# Patient Record
Sex: MALE | Race: WHITE | URBAN - METROPOLITAN AREA
[De-identification: names, ages, dates, MRNs, and addresses within clinical notes are randomized per-mention and may not be internally consistent; named-entity substitution may affect disease eponyms.]

---

## 2017-04-11 ENCOUNTER — MYC REFILL (OUTPATIENT)
Dept: FAMILY MEDICINE | Facility: CLINIC | Age: 62
End: 2017-04-11

## 2017-04-11 DIAGNOSIS — I10 ESSENTIAL HYPERTENSION: Primary | ICD-10-CM

## 2017-04-11 DIAGNOSIS — E78.5 HYPERLIPIDEMIA LDL GOAL <130: ICD-10-CM

## 2017-04-11 RX ORDER — LISINOPRIL 20 MG/1
20 TABLET ORAL DAILY
Qty: 90 TABLET | Refills: 1 | Status: SHIPPED | OUTPATIENT
Start: 2017-04-11 | End: 2017-06-20

## 2017-04-11 RX ORDER — SIMVASTATIN 20 MG
20 TABLET ORAL AT BEDTIME
Qty: 90 TABLET | Refills: 1 | Status: SHIPPED | OUTPATIENT
Start: 2017-04-11 | End: 2017-06-20

## 2017-04-11 NOTE — TELEPHONE ENCOUNTER
simvastatin (ZOCOR) 20 MG tablet     Last Written Prescription Date: 9/12/2016  Last Fill Quantity: 90, # refills: 3  Last Office Visit with [facility] or  Health prescribing provider: 9/12/2016  Next 5 appointments (look out 90 days)     Jun 19, 2017  8:20 AM CDT   PHYSICAL with KIM Aviles CNP   [facility] ([facility])    [address], [address]  [place] [address]   [phone]                   Lab Results   Component Value Date    CHOL 188 09/12/2016     Lab Results   Component Value Date    HDL 39 09/12/2016     Lab Results   Component Value Date     09/12/2016     Lab Results   Component Value Date    TRIG 202 09/12/2016     Lab Results   Component Value Date    CHOLHDLRATIO 4.0 09/09/2015     lisinopril (PRINIVIL,ZESTRIL) 20 MG tablet      Last Written Prescription Date: 9/12/2016  Last Fill Quantity: 90, # refills: 3  Last Office Visit with [facility] or  Health prescribing provider: 9/12/2016  Next 5 appointments (look out 90 days)     Jun 19, 2017  8:20 AM CDT   PHYSICAL with KIM Aviles CNP   [facility] ([facility])    [address], [address]  [place] [id number] [phone]                   Potassium   Date Value Ref Range Status   09/12/2016 3.9 3.4 - 5.3 mmol/L Final     Creatinine   Date Value Ref Range Status   09/12/2016 1.00 0.66 - 1.25 mg/dL Final     BP Readings from Last 3 Encounters:   09/12/16 122/78   08/09/16 128/80   09/09/15 122/68

## 2017-04-11 NOTE — TELEPHONE ENCOUNTER
[facility]  Original authorizing provider: KIM Avelar CNP    Edgar RALPHIsrael Salomon would like a refill of the following medications:  lisinopril (PRINIVIL,ZESTRIL) 20 MG tablet [KIM Avelar CNP]  simvastatin (ZOCOR) 20 MG tablet [KIM Avelar CNP]    Preferred pharmacy: [facility] [phone]    Comment:  I believe I have refills remaining on both Lisinopril and Simvastatin but I don't have the original packaging and my [facility] here in [facility] could not find them. Are you able to send new prescriptions (or resend the existing ones) so that I may refill?

## 2017-06-20 ENCOUNTER — MYC MEDICAL ADVICE (OUTPATIENT)
Dept: FAMILY MEDICINE | Facility: CLINIC | Age: 62
End: 2017-06-20

## 2017-06-20 ENCOUNTER — OFFICE VISIT (OUTPATIENT)
Dept: FAMILY MEDICINE | Facility: CLINIC | Age: 62
End: 2017-06-20
Payer: COMMERCIAL

## 2017-06-20 VITALS
HEART RATE: 68 BPM | WEIGHT: 198 LBS | HEIGHT: 72 IN | BODY MASS INDEX: 26.82 KG/M2 | TEMPERATURE: 97.6 F | DIASTOLIC BLOOD PRESSURE: 68 MMHG | SYSTOLIC BLOOD PRESSURE: 124 MMHG | RESPIRATION RATE: 16 BRPM

## 2017-06-20 DIAGNOSIS — E78.5 HYPERLIPIDEMIA LDL GOAL <130: ICD-10-CM

## 2017-06-20 DIAGNOSIS — F98.8 ATTENTION DEFICIT DISORDER: ICD-10-CM

## 2017-06-20 DIAGNOSIS — I10 ESSENTIAL HYPERTENSION: ICD-10-CM

## 2017-06-20 DIAGNOSIS — Z00.01 ENCOUNTER FOR ROUTINE ADULT HEALTH EXAMINATION WITH ABNORMAL FINDINGS: Primary | ICD-10-CM

## 2017-06-20 DIAGNOSIS — Z13.1 SCREENING FOR DIABETES MELLITUS: Primary | ICD-10-CM

## 2017-06-20 LAB
ALBUMIN SERPL-MCNC: 4.4 G/DL (ref 3.4–5)
ALP SERPL-CCNC: 83 U/L (ref 40–150)
ALT SERPL W P-5'-P-CCNC: 39 U/L (ref 0–70)
ANION GAP SERPL CALCULATED.3IONS-SCNC: 11 MMOL/L (ref 3–14)
AST SERPL W P-5'-P-CCNC: 25 U/L (ref 0–45)
BILIRUB SERPL-MCNC: 0.8 MG/DL (ref 0.2–1.3)
BUN SERPL-MCNC: 19 MG/DL (ref 7–30)
CALCIUM SERPL-MCNC: 9.3 MG/DL (ref 8.5–10.1)
CHLORIDE SERPL-SCNC: 104 MMOL/L (ref 94–109)
CHOLEST SERPL-MCNC: 162 MG/DL
CO2 SERPL-SCNC: 26 MMOL/L (ref 20–32)
CREAT SERPL-MCNC: 0.97 MG/DL (ref 0.66–1.25)
GFR SERPL CREATININE-BSD FRML MDRD: 78 ML/MIN/1.7M2
GLUCOSE SERPL-MCNC: 107 MG/DL (ref 70–99)
HDLC SERPL-MCNC: 60 MG/DL
LDLC SERPL CALC-MCNC: 83 MG/DL
NONHDLC SERPL-MCNC: 102 MG/DL
POTASSIUM SERPL-SCNC: 4.2 MMOL/L (ref 3.4–5.3)
PROT SERPL-MCNC: 7.8 G/DL (ref 6.8–8.8)
PSA SERPL-ACNC: 1.11 UG/L (ref 0–4)
SODIUM SERPL-SCNC: 141 MMOL/L (ref 133–144)
TRIGL SERPL-MCNC: 97 MG/DL

## 2017-06-20 PROCEDURE — G0103 PSA SCREENING: HCPCS | Performed by: NURSE PRACTITIONER

## 2017-06-20 PROCEDURE — 36415 COLL VENOUS BLD VENIPUNCTURE: CPT | Performed by: NURSE PRACTITIONER

## 2017-06-20 PROCEDURE — 80053 COMPREHEN METABOLIC PANEL: CPT | Performed by: NURSE PRACTITIONER

## 2017-06-20 PROCEDURE — 99396 PREV VISIT EST AGE 40-64: CPT | Performed by: NURSE PRACTITIONER

## 2017-06-20 PROCEDURE — 80061 LIPID PANEL: CPT | Performed by: NURSE PRACTITIONER

## 2017-06-20 RX ORDER — LISINOPRIL 20 MG/1
20 TABLET ORAL DAILY
Qty: 90 TABLET | Refills: 3 | Status: SHIPPED | OUTPATIENT
Start: 2017-06-20 | End: 2017-11-23

## 2017-06-20 RX ORDER — SIMVASTATIN 20 MG
20 TABLET ORAL AT BEDTIME
Qty: 90 TABLET | Refills: 3 | Status: SHIPPED | OUTPATIENT
Start: 2017-06-20 | End: 2017-11-23

## 2017-06-20 ASSESSMENT — PAIN SCALES - GENERAL: PAINLEVEL: NO PAIN (0)

## 2017-06-20 NOTE — MR AVS SNAPSHOT
After Visit Summary   6/20/2017    Edgar Salomon    MRN: [id number]           Patient Information     Date Of Birth          1955        Visit Information        Provider Department      6/20/2017 9:20 AM Jeniffer Ortiz APRN [facility]        Today's Diagnoses     Encounter for routine adult health examination with abnormal findings    -  1    Attention deficit disorder        Hyperlipidemia LDL goal <130        Essential hypertension          Care Instructions      Preventive Health Recommendations  Male Ages 50 - 64    Yearly exam:             See your health care provider every year in order to  o   Review health changes.   o   Discuss preventive care.    o   Review your medicines if your doctor has prescribed any.     Have a cholesterol test every 5 years, or more frequently if you are at risk for high cholesterol/heart disease.     Have a diabetes test (fasting glucose) every three years. If you are at risk for diabetes, you should have this test more often.     Have a colonoscopy at age 50, or have a yearly FIT test (stool test). These exams will check for colon cancer.      Talk with your health care provider about whether or not a prostate cancer screening test (PSA) is right for you.    You should be tested each year for STDs (sexually transmitted diseases), if you re at risk.     Shots: Get a flu shot each year. Get a tetanus shot every 10 years.     Nutrition:    Eat at least 5 servings of fruits and vegetables daily.     Eat whole-grain bread, whole-wheat pasta and brown rice instead of white grains and rice.     Talk to your provider about Calcium and Vitamin D.     Lifestyle    Exercise for at least 150 minutes a week (30 minutes a day, 5 days a week). This will help you control your weight and prevent disease.     Limit alcohol to one drink per day.     No smoking.     Wear sunscreen to prevent skin cancer.     See your dentist every six months for an exam and  "cleaning.     See your eye doctor every 1 to 2 years.            Follow-ups after your visit        Who to contact     If you have questions or need follow up information about today's clinic visit or your schedule please contact [facility] directly at [phone].  Normal or non-critical lab and imaging results will be communicated to you by MyChart, letter or phone within 4 business days after the clinic has received the results. If you do not hear from us within 7 days, please contact the clinic through [employer] or phone. If you have a critical or abnormal lab result, we will notify you by phone as soon as possible.  Submit refill requests through [employer] or call your pharmacy and they will forward the refill request to us. Please allow 3 business days for your refill to be completed.          Additional Information About Your Visit        MyChart Information     [employer] gives you secure access to your electronic health record. If you see a primary care provider, you can also send messages to your care team and make appointments. If you have questions, please call your primary care clinic.  If you do not have a primary care provider, please call [phone] and they will assist you.        Care EveryWhere ID     This is your Care EveryWhere ID. This could be used by other organizations to access your [facility] medical records  [id number]        Your Vitals Were     Pulse Temperature Respirations Height BMI (Body Mass Index)       68 97.6  F (36.4  C) (Temporal) 16 5' 11.85\" (1.825 m) 26.97 kg/m2        Blood Pressure from Last 3 Encounters:   06/20/17 124/68   09/12/16 122/78   08/09/16 128/80    Weight from Last 3 Encounters:   06/20/17 198 lb (89.8 kg)   09/12/16 220 lb 4.8 oz (99.9 kg)   08/09/16 224 lb 12.8 oz (102 kg)              We Performed the Following     Comprehensive metabolic panel     Lipid panel reflex to direct LDL     Prostate spec antigen screen          Where to get your " medicines      These medications were sent to [id number] - [place], [place] - [address] [place] [address]  [address] [place] [address]     Phone:  [phone]     lisinopril 20 MG tablet    simvastatin 20 MG tablet          Primary Care Provider Office Phone # Fax #    KIM Aviles [phone]       [facility] [address] [facility] [address]        Equal Access to Services     STEPHANY SANTILLAN : Hadii aad ku hadasho Soomaali, waaxda luqadaha, qaybta kaalmada adeegyada, waxay idiin hayaan adeeg kharaanalia la'ally . So [facility] [phone].    ATENCIÓN: Si habla español, tiene a gandhi disposición servicios gratuitos de asistencia lingüística. [facility] [phone].    We comply with applicable federal civil rights laws and [place] laws. We do not discriminate on the basis of race, color, national origin, age, disability sex, sexual orientation or gender identity.            Thank you!     Thank you for choosing [facility]  for your care. Our goal is always to provide you with excellent care. Hearing back from our patients is one way we can continue to improve our services. Please take a few minutes to complete the written survey that you may receive in the mail after your visit with us. Thank you!             Your Updated Medication List - Protect others around you: Learn how to safely use, store and throw away your medicines at www.disposemymeds.org.          This list is accurate as of: 6/20/17 11:59 PM.  Always use your most recent med list.                   Brand Name Dispense Instructions for use Diagnosis    aspirin 81 MG tablet      Take 1 tablet by mouth daily. 1 tablet daily        BREATHE RIGHT           EPINEPHrine 0.3 MG/0.3ML injection     0.6 mL    Inject 0.3 mLs (0.3 mg) into the muscle as needed for anaphylaxis    Allergy to other foods       lisinopril 20 MG tablet    PRINIVIL/ZESTRIL    90 tablet    Take 1 tablet (20 mg) by mouth daily    Essential  hypertension       multivitamin per tablet     100    ONE DAILY        OMEGA-3 FISH OIL PO           omeprazole 20 MG CR capsule    priLOSEC    90    ONE DAILY    Esophageal reflux       RITALIN PO      Take 5 mg by mouth Up to four tablets day        simvastatin 20 MG tablet    ZOCOR    90 tablet    Take 1 tablet (20 mg) by mouth At Bedtime    Hyperlipidemia LDL goal <130

## 2017-06-20 NOTE — PROGRESS NOTES
SUBJECTIVE:     CC: Edgar Salomon is an 61 year old male who presents for preventative health visit.     Physical   Annual:     Getting at least 3 servings of Calcium per day::  Yes    Bi-annual eye exam::  Yes    Dental care twice a year::  Yes    Sleep apnea or symptoms of sleep apnea::  None    Diet::  Carbohydrate counting    Frequency of exercise::  4-5 days/week    Duration of exercise::  30-45 minutes    Taking medications regularly::  Yes    Medication side effects::  None    Additional concerns today::  YES (1) want to discuss about medications)              Today's PHQ-2 Score:   PHQ-2 ( 1999 Pfizer) 6/20/2017   Q1: Little interest or pleasure in doing things 0   Q2: Feeling down, depressed or hopeless 0   PHQ-2 Score 0   Q1: Little interest or pleasure in doing things Not at all   Q2: Feeling down, depressed or hopeless Not at all   PHQ-2 Score 0       Abuse: Current or Past(Physical, Sexual or Emotional)- No  Do you feel safe in your environment - Yes    Social History   Substance Use Topics     Smoking status: Former Smoker     Quit date: 6/5/1991     Smokeless tobacco: Never Used     Alcohol use 7.0 oz/week      Comment: 1-3 serving 4-5 times a week     The patient does not drink >3 drinks per day nor >7 drinks per week.    Last PSA:   PSA   Date Value Ref Range Status   06/20/2017 1.11 0 - 4 ug/L Final     Comment:     Assay Method:  Chemiluminescence using Siemens Vista analyzer       Recent Labs   Lab Test  09/12/16   0841  09/09/15   0816  08/11/14   0845   CHOL  188  172  179   HDL  39*  43  42   LDL  109*  90  102   TRIG  202*  195*  175*   CHOLHDLRATIO   --   4.0  4.3   NHDL  149*   --    --        Reviewed orders with patient. Reviewed health maintenance and updated orders accordingly - Yes    Reviewed and updated as needed this visit by clinical staff  Tobacco  Allergies  Meds  Problems  Med Hx  Surg Hx  Fam Hx  Soc Hx          Patient reports that he has been having trouble reading,  and focusing on short term tasks. He feels tired after driving, and usually prefers his wife to drive. He currently takes Ritalin to alleviate symptoms to help him focus on his tasks. Patient reports that he uses this to stay awake in addition to helping him focus. He reports seeing a neurologist at [facility], however there was no diagnosis. Patient notes he considered filing for disability due to symptoms, however he did not pursue that course of action.    He reports that his High Blood Pressure has been controlled using Lisinopril for the past 10 years.    Patient reports that as part of controlling his diabetes, he has been watching what he eats. He has lost weight since his visit last year. He states that his son also lost weight, and he is working with him to better his diet. He reports reducing his carbohydrate intake, and recently began to increase his carbohydrate intake in his diet to at least 1500 calories. He notes that he drinks whiskey before dinner each night.    He states he is low on his medications, and would like to refill needed medications at this time.       He reports that he has a family medical history of melanoma, however denies esophogeal cancer. He also denies upper GI scope.   FMHx: Daughter- Bipolar, Granddaughter Autistic      Patient reports that he had back pain on a rate of 7-8/10 severity, however post-surgery the pain alleviated to a 5-6/10      Reviewed and updated as needed this visit by Provider  Allergies  Meds  Problems         ROS:  C: NEGATIVE for fever, chills, change in weight  I: NEGATIVE for worrisome rashes, moles or lesions  E: NEGATIVE for vision changes or irritation  ENT: NEGATIVE for ear, mouth and throat problems  R: NEGATIVE for significant cough or SOB  CV: NEGATIVE for chest pain, palpitations or peripheral edema  GI: NEGATIVE for nausea, abdominal pain, heartburn, or change in bowel habits   male: negative for dysuria, hematuria, decreased urinary  "stream, erectile dysfunction, urethral discharge  M: NEGATIVE for significant arthralgias or myalgia  N: NEGATIVE for weakness, dizziness or paresthesias  P: NEGATIVE for changes in mood or affect    Problem list, Medication list, Allergies, and Medical/Social/Surgical histories reviewed in [facility] and updated as appropriate.    This document serves as a record of the services and decisions personally performed and made by Jeniffer Ortiz DNP. It was created on her behalf by Jenny Jasso, a trained medical scribe. The creation of this document is based on the provider's statements to the medical scribe.  Jenny Jasso 10:31 AM June 20, 2017      OBJECTIVE:     /68  Pulse 68  Temp 97.6  F (36.4  C) (Temporal)  Resp 16  Ht 5' 11.85\" (1.825 m)  Wt 198 lb (89.8 kg)  BMI 26.97 kg/m2  EXAM:  GENERAL: healthy, alert and no distress  EYES: Eyes grossly normal to inspection, PERRL and conjunctivae and sclerae normal  HENT: ear canals and TM's normal, nose and mouth without ulcers or lesions  NECK: no adenopathy, no asymmetry, masses, or scars and thyroid normal to palpation  RESP: lungs clear to auscultation - no rales, rhonchi or wheezes  CV: regular rate and rhythm, normal S1 S2, no S3 or S4, no murmur, click or rub, no peripheral edema and peripheral pulses strong  ABDOMEN: soft, nontender, no hepatosplenomegaly, no masses and bowel sounds normal  MS: no gross musculoskeletal defects noted, no edema  SKIN: no suspicious lesions or rashes  NEURO: Normal strength and tone, mentation intact and speech normal  PSYCH: mentation appears normal, affect normal/bright       ASSESSMENT/PLAN:         ICD-10-CM    1. Encounter for routine adult health examination with abnormal findings Z00.01 Lipid panel reflex to direct LDL     Prostate spec antigen screen     Comprehensive metabolic panel   2. Attention deficit disorder F98.8    3. Hyperlipidemia LDL goal <130 E78.5 simvastatin (ZOCOR) 20 MG tablet   4. Essential hypertension " "I10 lisinopril (PRINIVIL/ZESTRIL) 20 MG tablet      Discussed plan of action for controlling ADD with Ritalin.   Follow up: If needed    Labs ordered.      COUNSELING:   Reviewed preventive health counseling, as reflected in patient instructions       Regular exercise       Healthy diet/nutrition       Colon cancer screening       Prostate cancer screening       Discussed his ADD at length with his HBP   Discussed need for psychiatry. Offered behavioral health- pt. Declines. Discussed contraindication of HTN and Ritalin.        reports that he quit smoking about 26 years ago. He has never used smokeless tobacco.    Estimated body mass index is 26.97 kg/(m^2) as calculated from the following:    Height as of this encounter: 5' 11.85\" (1.825 m).    Weight as of this encounter: 198 lb (89.8 kg).   Weight management plan: Discussed healthy diet and exercise guidelines and patient will follow up in 12 months in clinic to re-evaluate.    Counseling Resources:  ATP IV Guidelines  Pooled Cohorts Equation Calculator  FRAX Risk Assessment  ICSI Preventive Guidelines  Dietary Guidelines for Americans, 2010  USDA's MyPlate  ASA Prophylaxis  Lung CA Screening      The information in this document, created by the medical scribe for me, accurately reflects the services I personally performed and the decisions made by me. I have reviewed and approved this document for accuracy prior to leaving the patient care area.  June 20, 2017 10:36 AM    KIM Avelar CNP  [facility]  "

## 2017-06-20 NOTE — PROGRESS NOTES
"  SUBJECTIVE:     CC: Edgar Salomon is an 61 year old male who presents for preventative health visit.     Healthy Habits:    Do you get at least three servings of calcium containing foods daily (dairy, green leafy vegetables, etc.)? {YES/NO, DAIRY INTAKE:417158::\"yes\"}    Amount of exercise or daily activities, outside of work: {AMOUNT EXERCISE:051735}    Problems taking medications regularly {Yes /No default:528627::\"No\"}    Medication side effects: {Yes /No default.:884779::\"No\"}    Have you had an eye exam in the past two years? {YESNOBLANK:232219}    Do you see a dentist twice per year? {YESNOBLANK:291288}    Do you have sleep apnea, excessive snoring or daytime drowsiness?{YESNOBLANK:588422}    {Outside tests to abstract? :033136}    {additional problems to add:860050}    Today's PHQ-2 Score:   PHQ-2 ( 1999 Pfizer) 6/20/2017 9/12/2016   Q1: Little interest or pleasure in doing things 0 0   Q2: Feeling down, depressed or hopeless 0 0   PHQ-2 Score 0 0   Q1: Little interest or pleasure in doing things Not at all -   Q2: Feeling down, depressed or hopeless Not at all -   PHQ-2 Score 0 -     {PHQ-2 LOOK IN ASSESSMENTS :415641}  Abuse: Current or Past(Physical, Sexual or Emotional)- {YES/NO/NA:668423}  Do you feel safe in your environment - {YES/NO/NA:457602}    Social History   Substance Use Topics     Smoking status: Former Smoker     Quit date: 6/5/1991     Smokeless tobacco: Never Used     Alcohol use 7.0 oz/week      Comment: 1-3 serving 4-5 times a week     {ETOH AUDIT:139264}    Last PSA:   PSA   Date Value Ref Range Status   09/12/2016 1.06 0 - 4 ug/L Final     Comment:     Assay Method:  Chemiluminescence using Siemens Vista analyzer       Recent Labs   Lab Test  09/12/16   0841  09/09/15   0816  08/11/14   0845   CHOL  188  172  179   HDL  39*  43  42   LDL  109*  90  102   TRIG  202*  195*  175*   CHOLHDLRATIO   --   4.0  4.3   NHDL  149*   --    --        Reviewed orders with patient. Reviewed health " "maintenance and updated orders accordingly - {Yes/No:474256::\"Yes\"}    Reviewed and updated as needed this visit by clinical staff         Reviewed and updated as needed this visit by Provider        {HISTORY OPTIONS:772714}    ROS:  {MALE ROS-adult preventive care package:236488::\"C: NEGATIVE for fever, chills, change in weight\",\"I: NEGATIVE for worrisome rashes, moles or lesions\",\"E: NEGATIVE for vision changes or irritation\",\"ENT: NEGATIVE for ear, mouth and throat problems\",\"R: NEGATIVE for significant cough or SOB\",\"CV: NEGATIVE for chest pain, palpitations or peripheral edema\",\"GI: NEGATIVE for nausea, abdominal pain, heartburn, or change in bowel habits\",\" male: negative for dysuria, hematuria, decreased urinary stream, erectile dysfunction, urethral discharge\",\"M: NEGATIVE for significant arthralgias or myalgia\",\"N: NEGATIVE for weakness, dizziness or paresthesias\",\"P: NEGATIVE for changes in mood or affect\"}    {CHRONICPROBDATA:929277}  OBJECTIVE:     There were no vitals taken for this visit.  EXAM:  {Exam Choices:518916}    ASSESSMENT/PLAN:     {Diag Picklist:456432}    COUNSELING:  {MALE COUNSELING MESSAGES:289416::\"Reviewed preventive health counseling, as reflected in patient instructions\"}    {Blood Pressure - Adult Preventive:991812}     reports that he quit smoking about 26 years ago. He has never used smokeless tobacco.  {Tobacco Cessation needed for ACO -- Delete if patient is a non-smoker:497285}  Estimated body mass index is 29.88 kg/(m^2) as calculated from the following:    Height as of 9/12/16: 6' (1.829 m).    Weight as of 9/12/16: 220 lb 4.8 oz (99.9 kg).   {Weight Management Plan needed for ACO:888884}    Counseling Resources:  ATP IV Guidelines  Pooled Cohorts Equation Calculator  FRAX Risk Assessment  ICSI Preventive Guidelines  Dietary Guidelines for Americans, 2010  USDA's MyPlate  ASA Prophylaxis  Lung CA Screening    KIM Avelar [facility]  "

## 2017-06-20 NOTE — NURSING NOTE
"Chief Complaint   Patient presents with     Physical       Initial /68  Pulse 68  Temp 97.6  F (36.4  C) (Temporal)  Resp 16  Ht 5' 11.85\" (1.825 m)  Wt 198 lb (89.8 kg)  BMI 26.97 kg/m2 Estimated body mass index is 26.97 kg/(m^2) as calculated from the following:    Height as of this encounter: 5' 11.85\" (1.825 m).    Weight as of this encounter: 198 lb (89.8 kg).  Medication Reconciliation: complete       Miguel Morales MA    "

## 2017-06-21 NOTE — TELEPHONE ENCOUNTER
A1c was not drawn yesterday. I have contacted the lab and they do not have the correct tube to add on. I have replied to the pt informing him he can come into the lab to have this done. Jeniffer did want to monitor his A1c. Starla Laureano CMA ([facility])

## 2017-08-18 ENCOUNTER — TELEPHONE (OUTPATIENT)
Dept: FAMILY MEDICINE | Facility: CLINIC | Age: 62
End: 2017-08-18

## 2017-08-18 NOTE — TELEPHONE ENCOUNTER
Left message for pt. Please help patient schedule non-fasting lab only for A1c check .   Panel Management Review      Patient has the following on his problem list:     Hypertension   Last three blood pressure readings:  BP Readings from Last 3 Encounters:   06/20/17 124/68   09/12/16 122/78   08/09/16 128/80     Blood pressure: Passed    HTN Guidelines:  Age 18-59 BP range:  Less than 140/90  Age 60-85 with Diabetes:  Less than 140/90  Age 60-85 without Diabetes:  less than 150/90        Composite cancer screening  Chart review shows that this patient is due/due soon for the following None  Summary:    Patient is due/failing the following:   A1C    Action needed:   Patient needs non-fasting lab only appointment    Type of outreach:    Phone, left message for patient to call back.     Questions for provider review:    None                                                                                                                                    Miguel Morales MA       Chart routed to Care Team .

## 2017-10-05 ENCOUNTER — TELEPHONE (OUTPATIENT)
Dept: FAMILY MEDICINE | Facility: CLINIC | Age: 62
End: 2017-10-05

## 2017-10-06 ENCOUNTER — MYC MEDICAL ADVICE (OUTPATIENT)
Dept: FAMILY MEDICINE | Facility: CLINIC | Age: 62
End: 2017-10-06

## 2017-11-22 NOTE — TELEPHONE ENCOUNTER
Tried to contact patient regarding need for A1C and patient phone was busy.    Dalila Padron, CMA  November 22, 2017

## 2017-11-23 ENCOUNTER — MYC REFILL (OUTPATIENT)
Dept: FAMILY MEDICINE | Facility: CLINIC | Age: 62
End: 2017-11-23

## 2017-11-23 DIAGNOSIS — I10 ESSENTIAL HYPERTENSION: ICD-10-CM

## 2017-11-23 DIAGNOSIS — E78.5 HYPERLIPIDEMIA LDL GOAL <130: ICD-10-CM

## 2017-11-24 RX ORDER — LISINOPRIL 20 MG/1
20 TABLET ORAL DAILY
Qty: 90 TABLET | Refills: 1 | Status: SHIPPED | OUTPATIENT
Start: 2017-11-24 | End: 2018-05-28

## 2017-11-24 RX ORDER — SIMVASTATIN 20 MG
20 TABLET ORAL AT BEDTIME
Qty: 90 TABLET | Refills: 1 | Status: SHIPPED | OUTPATIENT
Start: 2017-11-24 | End: 2018-05-28

## 2017-11-24 NOTE — TELEPHONE ENCOUNTER
Requested Prescriptions   Pending Prescriptions Disp Refills     lisinopril (PRINIVIL/ZESTRIL) 20 MG tablet 90 tablet 3     Sig: Take 1 tablet (20 mg) by mouth daily    ACE Inhibitors (Including Combos) Protocol Passed    11/24/2017  8:02 AM       Passed - Blood pressure under 140/90    BP Readings from Last 3 Encounters:   06/20/17 124/68   09/12/16 122/78   08/09/16 128/80                Passed - Recent or future visit with authorizing provider's specialty    Patient had office visit in the last year or has a visit in the next 30 days with authorizing provider.  See chart review.              Passed - Patient is age 18 or older       Passed - Normal serum creatinine on file in past 12 months    Recent Labs   Lab Test  06/20/17   0947   CR  0.97            Passed - Normal serum potassium on file in past 12 months    Recent Labs   Lab Test  06/20/17   0947   POTASSIUM  4.2             simvastatin (ZOCOR) 20 MG tablet 90 tablet 3     Sig: Take 1 tablet (20 mg) by mouth At Bedtime    Statins Protocol Passed    11/24/2017  8:02 AM       Passed - LDL on file in past 12 months    Recent Labs   Lab Test  06/20/17   0947   LDL  83            Passed - No abnormal creatine kinase in past 12 months    No lab results found.         Passed - Recent or future visit with authorizing provider    Patient had office visit in the last year or has a visit in the next 30 days with authorizing provider.  See chart review.              Passed - Patient is age 18 or older        lisinopril (PRINIVIL/ZESTRIL) 20 MG tablet  Transferred to new pharmacy.     simvastatin (ZOCOR) 20 MG tablet  Transferred to new pharmacy.    Ana Laura Evans, RN, BSN

## 2017-11-24 NOTE — TELEPHONE ENCOUNTER
Message from MyChart:  Original authorizing provider: KIM Avelar CNP LOREEIsrael Salomon would like a refill of the following medications:  lisinopril (PRINIVIL/ZESTRIL) 20 MG tablet [KIM Avelar CNP]  simvastatin (ZOCOR) 20 MG tablet [KIM Avelar CNP]    Preferred pharmacy: [id number] [place] [address]    Comment:

## 2018-03-16 ENCOUNTER — MYC MEDICAL ADVICE (OUTPATIENT)
Dept: FAMILY MEDICINE | Facility: CLINIC | Age: 63
End: 2018-03-16

## 2018-03-16 DIAGNOSIS — L98.9 SKIN LESION: Primary | ICD-10-CM

## 2018-04-27 ENCOUNTER — TELEPHONE (OUTPATIENT)
Dept: FAMILY MEDICINE | Facility: CLINIC | Age: 63
End: 2018-04-27

## 2018-04-27 NOTE — TELEPHONE ENCOUNTER
You placed a referral to [facility] on 3/16/18.    It is unclear if the patient has scheduled yet, not finding a report showing they were seen.     Please forward to your team if further follow up is needed to see if they have made this appointment.      Thank you!   Marissa/Referral Representative for [facility]

## 2018-04-27 NOTE — LETTER
[facility]  [address]  [place] [address]  [phone]      April 27, 2018    Edgar Salomon                                                                                                                     [id number] [address]        Dear Edgar,    We received a notice that you are to be scheduled with a specialty clinic. The referral has been placed by your provider and you can call to schedule an appointment directly.     Enclosed, you will find the referral with the phone number to call to schedule an appointment.  If you have already scheduled this, you may disregard this letter.    Please call us if you have any questions or concerns.    Sincerely,     [facility] Support Staff / asad

## 2018-05-28 ENCOUNTER — MYC REFILL (OUTPATIENT)
Dept: FAMILY MEDICINE | Facility: CLINIC | Age: 63
End: 2018-05-28

## 2018-05-28 DIAGNOSIS — E78.5 HYPERLIPIDEMIA LDL GOAL <130: ICD-10-CM

## 2018-05-28 DIAGNOSIS — I10 ESSENTIAL HYPERTENSION: ICD-10-CM

## 2018-05-29 RX ORDER — SIMVASTATIN 20 MG
20 TABLET ORAL AT BEDTIME
Qty: 30 TABLET | Refills: 0 | Status: SHIPPED | OUTPATIENT
Start: 2018-05-29 | End: 2018-05-31

## 2018-05-29 RX ORDER — LISINOPRIL 20 MG/1
20 TABLET ORAL DAILY
Qty: 30 TABLET | Refills: 0 | Status: SHIPPED | OUTPATIENT
Start: 2018-05-29 | End: 2018-05-31

## 2018-05-29 NOTE — TELEPHONE ENCOUNTER
Message from MyChart:  Original authorizing provider: KIM Avelar CNP LOREEIsrael Salomon would like a refill of the following medications:  lisinopril (PRINIVIL/ZESTRIL) 20 MG tablet [KIM Avelar CNP]  simvastatin (ZOCOR) 20 MG tablet [KIM Avelar CNP]    Preferred pharmacy: [address]    Comment:

## 2018-05-29 NOTE — TELEPHONE ENCOUNTER
"Requested Prescriptions   Pending Prescriptions Disp Refills     lisinopril (PRINIVIL/ZESTRIL) 20 MG tablet 90 tablet 1     Sig: Take 1 tablet (20 mg) by mouth daily    ACE Inhibitors (Including Combos) Protocol Passed    5/29/2018  8:23 AM       Passed - Blood pressure under 140/90 in past 12 months    BP Readings from Last 3 Encounters:   06/20/17 124/68   09/12/16 122/78   08/09/16 128/80          Passed - Recent (12 mo) or future (30 days) visit within the authorizing provider's specialty    Patient had office visit in the last 12 months or has a visit in the next 30 days with authorizing provider or within the authorizing provider's specialty.  See \"Patient Info\" tab in inbasket, or \"Choose Columns\" in Meds & Orders section of the refill encounter.           Passed - Patient is age 18 or older       Passed - Normal serum creatinine on file in past 12 months    Recent Labs   Lab Test  06/20/17   0947   CR  0.97          Passed - Normal serum potassium on file in past 12 months    Recent Labs   Lab Test  06/20/17   0947   POTASSIUM  4.2           simvastatin (ZOCOR) 20 MG tablet 90 tablet 1     Sig: Take 1 tablet (20 mg) by mouth At Bedtime    Statins Protocol Passed    5/29/2018  8:23 AM       Passed - LDL on file in past 12 months    Recent Labs   Lab Test  06/20/17   0947   LDL  83          Passed - No abnormal creatine kinase in past 12 months    No lab results found.        Passed - Recent (12 mo) or future (30 days) visit within the authorizing provider's specialty    Patient had office visit in the last 12 months or has a visit in the next 30 days with authorizing provider or within the authorizing provider's specialty.  See \"Patient Info\" tab in inbasket, or \"Choose Columns\" in Meds & Orders section of the refill encounter.           Passed - Patient is age 18 or older        lisinopril (PRINIVIL/ZESTRIL) 20 MG tablet  Medication is being filled for 1 time refill only due to:  Patient needs to be seen " because due for physical 06/20/2018.    simvastatin (ZOCOR) 20 MG tablet  Medication is being filled for 1 time refill only due to:  Patient needs to be seen because due for physical 06/20/2018.  Next 5 appointments (look out 90 days)     Jul 31, 2018  8:20 AM CDT   PHYSICAL with KIM Aviles CNP   [facility] ([facility]    [address], [address]  [place] [id number] [phone]              Please assist with scheduling - please reach out to patient and see if desired to complete physical closer to 1 year amado.    Ana Laura Evans, RN, BSN

## 2018-05-30 ENCOUNTER — MYC MEDICAL ADVICE (OUTPATIENT)
Dept: FAMILY MEDICINE | Facility: CLINIC | Age: 63
End: 2018-05-30

## 2018-05-30 DIAGNOSIS — E78.5 HYPERLIPIDEMIA LDL GOAL <130: ICD-10-CM

## 2018-05-30 DIAGNOSIS — I10 ESSENTIAL HYPERTENSION: ICD-10-CM

## 2018-05-30 NOTE — TELEPHONE ENCOUNTER
Provider, appears that the RN refilled the Rx's for 30 days.  Pt has appt on 7/31.  Can you send a 90 days supply instead?

## 2018-05-31 RX ORDER — LISINOPRIL 20 MG/1
20 TABLET ORAL DAILY
Qty: 90 TABLET | Refills: 0 | Status: SHIPPED | OUTPATIENT
Start: 2018-05-31

## 2018-05-31 RX ORDER — SIMVASTATIN 20 MG
20 TABLET ORAL AT BEDTIME
Qty: 90 TABLET | Refills: 0 | Status: SHIPPED | OUTPATIENT
Start: 2018-05-31

## 2019-09-29 ENCOUNTER — HEALTH MAINTENANCE LETTER (OUTPATIENT)
Age: 64
End: 2019-09-29

## 2021-01-14 ENCOUNTER — HEALTH MAINTENANCE LETTER (OUTPATIENT)
Age: 66
End: 2021-01-14

## 2021-10-24 ENCOUNTER — HEALTH MAINTENANCE LETTER (OUTPATIENT)
Age: 66
End: 2021-10-24

## 2022-02-13 ENCOUNTER — HEALTH MAINTENANCE LETTER (OUTPATIENT)
Age: 67
End: 2022-02-13

## 2022-10-15 ENCOUNTER — HEALTH MAINTENANCE LETTER (OUTPATIENT)
Age: 67
End: 2022-10-15

## 2023-03-26 ENCOUNTER — HEALTH MAINTENANCE LETTER (OUTPATIENT)
Age: 68
End: 2023-03-26